# Patient Record
Sex: FEMALE | Race: BLACK OR AFRICAN AMERICAN | NOT HISPANIC OR LATINO | Employment: OTHER | URBAN - METROPOLITAN AREA
[De-identification: names, ages, dates, MRNs, and addresses within clinical notes are randomized per-mention and may not be internally consistent; named-entity substitution may affect disease eponyms.]

---

## 2021-07-20 ENCOUNTER — APPOINTMENT (OUTPATIENT)
Dept: RADIOLOGY | Facility: MEDICAL CENTER | Age: 76
End: 2021-07-20
Attending: EMERGENCY MEDICINE
Payer: MEDICARE

## 2021-07-20 ENCOUNTER — HOSPITAL ENCOUNTER (EMERGENCY)
Facility: MEDICAL CENTER | Age: 76
End: 2021-07-21
Attending: EMERGENCY MEDICINE
Payer: MEDICARE

## 2021-07-20 DIAGNOSIS — R06.02 SHORTNESS OF BREATH: ICD-10-CM

## 2021-07-20 DIAGNOSIS — J98.59 MEDIASTINAL MASS: ICD-10-CM

## 2021-07-20 LAB
ALBUMIN SERPL BCP-MCNC: 4.2 G/DL (ref 3.2–4.9)
ALBUMIN/GLOB SERPL: 1.2 G/DL
ALP SERPL-CCNC: 82 U/L (ref 30–99)
ALT SERPL-CCNC: 14 U/L (ref 2–50)
ANION GAP SERPL CALC-SCNC: 11 MMOL/L (ref 7–16)
AST SERPL-CCNC: 28 U/L (ref 12–45)
BASOPHILS # BLD AUTO: 0.8 % (ref 0–1.8)
BASOPHILS # BLD: 0.04 K/UL (ref 0–0.12)
BILIRUB SERPL-MCNC: 0.3 MG/DL (ref 0.1–1.5)
BUN SERPL-MCNC: 20 MG/DL (ref 8–22)
CALCIUM SERPL-MCNC: 9.9 MG/DL (ref 8.5–10.5)
CHLORIDE SERPL-SCNC: 105 MMOL/L (ref 96–112)
CO2 SERPL-SCNC: 26 MMOL/L (ref 20–33)
CREAT SERPL-MCNC: 1.01 MG/DL (ref 0.5–1.4)
EKG IMPRESSION: NORMAL
EOSINOPHIL # BLD AUTO: 0.18 K/UL (ref 0–0.51)
EOSINOPHIL NFR BLD: 3.4 % (ref 0–6.9)
ERYTHROCYTE [DISTWIDTH] IN BLOOD BY AUTOMATED COUNT: 43.8 FL (ref 35.9–50)
GLOBULIN SER CALC-MCNC: 3.5 G/DL (ref 1.9–3.5)
GLUCOSE SERPL-MCNC: 97 MG/DL (ref 65–99)
HCT VFR BLD AUTO: 45.5 % (ref 37–47)
HGB BLD-MCNC: 14.8 G/DL (ref 12–16)
IMM GRANULOCYTES # BLD AUTO: 0.01 K/UL (ref 0–0.11)
IMM GRANULOCYTES NFR BLD AUTO: 0.2 % (ref 0–0.9)
LYMPHOCYTES # BLD AUTO: 1.82 K/UL (ref 1–4.8)
LYMPHOCYTES NFR BLD: 34.1 % (ref 22–41)
MCH RBC QN AUTO: 29.5 PG (ref 27–33)
MCHC RBC AUTO-ENTMCNC: 32.5 G/DL (ref 33.6–35)
MCV RBC AUTO: 90.6 FL (ref 81.4–97.8)
MONOCYTES # BLD AUTO: 0.59 K/UL (ref 0–0.85)
MONOCYTES NFR BLD AUTO: 11.1 % (ref 0–13.4)
NEUTROPHILS # BLD AUTO: 2.69 K/UL (ref 2–7.15)
NEUTROPHILS NFR BLD: 50.4 % (ref 44–72)
NRBC # BLD AUTO: 0 K/UL
NRBC BLD-RTO: 0 /100 WBC
NT-PROBNP SERPL IA-MCNC: 61 PG/ML (ref 0–125)
PLATELET # BLD AUTO: 242 K/UL (ref 164–446)
PMV BLD AUTO: 9.8 FL (ref 9–12.9)
POTASSIUM SERPL-SCNC: 4.1 MMOL/L (ref 3.6–5.5)
PROT SERPL-MCNC: 7.7 G/DL (ref 6–8.2)
RBC # BLD AUTO: 5.02 M/UL (ref 4.2–5.4)
SODIUM SERPL-SCNC: 142 MMOL/L (ref 135–145)
TROPONIN T SERPL-MCNC: 16 NG/L (ref 6–19)
TROPONIN T SERPL-MCNC: 9 NG/L (ref 6–19)
WBC # BLD AUTO: 5.3 K/UL (ref 4.8–10.8)

## 2021-07-20 PROCEDURE — 700117 HCHG RX CONTRAST REV CODE 255: Performed by: EMERGENCY MEDICINE

## 2021-07-20 PROCEDURE — 80053 COMPREHEN METABOLIC PANEL: CPT

## 2021-07-20 PROCEDURE — 84484 ASSAY OF TROPONIN QUANT: CPT

## 2021-07-20 PROCEDURE — 83880 ASSAY OF NATRIURETIC PEPTIDE: CPT

## 2021-07-20 PROCEDURE — 85025 COMPLETE CBC W/AUTO DIFF WBC: CPT

## 2021-07-20 PROCEDURE — 71275 CT ANGIOGRAPHY CHEST: CPT

## 2021-07-20 PROCEDURE — 99284 EMERGENCY DEPT VISIT MOD MDM: CPT

## 2021-07-20 PROCEDURE — 93005 ELECTROCARDIOGRAM TRACING: CPT | Performed by: EMERGENCY MEDICINE

## 2021-07-20 PROCEDURE — 36415 COLL VENOUS BLD VENIPUNCTURE: CPT

## 2021-07-20 PROCEDURE — 71045 X-RAY EXAM CHEST 1 VIEW: CPT

## 2021-07-20 PROCEDURE — 93005 ELECTROCARDIOGRAM TRACING: CPT

## 2021-07-20 RX ORDER — LISINOPRIL 10 MG/1
10 TABLET ORAL DAILY
COMMUNITY

## 2021-07-20 RX ORDER — ALLOPURINOL 100 MG/1
100 TABLET ORAL DAILY
COMMUNITY

## 2021-07-20 RX ADMIN — IOHEXOL 55 ML: 350 INJECTION, SOLUTION INTRAVENOUS at 23:23

## 2021-07-21 VITALS
SYSTOLIC BLOOD PRESSURE: 153 MMHG | TEMPERATURE: 97.4 F | OXYGEN SATURATION: 92 % | DIASTOLIC BLOOD PRESSURE: 70 MMHG | HEART RATE: 72 BPM | WEIGHT: 189 LBS | HEIGHT: 62 IN | RESPIRATION RATE: 25 BRPM | BODY MASS INDEX: 34.78 KG/M2

## 2021-07-21 NOTE — DISCHARGE INSTRUCTIONS
Please follow-up with your primary care physician, as well as the pulmonology clinic listed above.  Call at the opening of business hours today, let them know that there was an abnormal mass seen on your chest CT in the emergency department today that requires close follow-up.  Please have them compare your CT scan today, with your prior CT scan performed in [place].  They should be able to see you within 1 week.  Return to the emergency department for recheck, or call for assistance, if you are not able to schedule a follow-up appointment within the next 5 to 7 days.  Additionally, please return if you develop any new or worsening symptoms, this includes chest pain, shortness of breath, worsening lightheadedness, or any further concerns.

## 2021-07-21 NOTE — NON-PROVIDER
Medical Student Note- DO NOT USE FOR PAT      CHIEF COMPLAINT  Chief Complaint   Patient presents with   • Shortness of Breath     worse with exertion   • Lightheadedness     dizziness with exertion       HPI  Rosa Phillip is a 76 y.o. female who presents for 6 day history of exertional shortness of breath and lightheadedness. Patient has a history of HTN and gout with previous unprovoked PE in 2011, not currently on anticoagulation. Patient additionally reports multiple presyncopal episodes with exertion associated with palpitations. Denies falls. Patient denies chest pain, orthopnea, fevers, hematemesis, and lower extremity swelling. No recent travel, surgeries or sick contacts. Patient states current SOB is similar to previous presentation of PE. She reports her baseline is being able to walk over 1 mile without difficulty. Currently reporting SOB walking up 1 flight of stairs or with minimal exertion. Decreased when sitting down. No history of hypercoagulable disorders or cancer.     REVIEW OF SYSTEMS  Negative for fever, cough, chest pain, abdominal pain, nausea, vomiting, diarrhea, focal weakness, peripheral swelling, back pain. All other systems are negative.     PAST MEDICAL HISTORY  HTN on Lisinopril; Reports taking as prescribed.  Gout on Allopurinol; Reports taking as prescribed.    FAMILY HISTORY  Patient denies family history of hypercoagulable disorders.    SOCIAL HISTORY  Social History     Tobacco Use   • Smoking status: Never Smoker   • Smokeless tobacco: Denies   Substance Use Topics   • Alcohol use: Denies   • Drug use: Denies       SURGICAL HISTORY  Denies recent surgeries    CURRENT MEDICATIONS  I personally reviewed the medication list in the charting documentation.     ALLERGIES  Allergies   Allergen Reactions   • Ceclor [Cefaclor]    • Cipro [Ciprofloxacin Hcl]        MEDICAL RECORD  I have reviewed patient's medical record and pertinent results are listed above.      PHYSICAL  "EXAM  VITAL SIGNS: /78   Pulse (!) 102   Temp 36.3 °C (97.4 °F) (Temporal)   Resp 18   Ht 1.575 m (5' 2\")   Wt 85.7 kg (189 lb)   SpO2 95%   BMI 34.57 kg/m²    Constitutional: Well appearing patient in no acute distress.  Awake and alert, not toxic nor ill in appearance.  HENT: Normocephalic, no obvious evidence of acute trauma.  Neck: Comfortable movement without any obvious restriction in the range of motion.  Cardiovascular: Tachycardia. Normal rhythm. No m/r/g   Thorax & Lungs: Lungs clear to ascultation bilaterally. Nonlabored respirations.  No chest wall tenderness.  No wheezing, rhonchi or rales.   Abdomen: No distention. No tenderness to palpation.  No masses appreciated.  Extremities/Musculoskeletal: No sign of acute trauma. No asymmetric calf tenderness or edema.   Neurologic: Alert & oriented. No focal deficits observed.   Psychiatric: Normal affect appropriate for the clinical situation.    LABS/EKGs  Recent Labs     07/20/21  1836   WBC 5.3   RBC 5.02   HEMOGLOBIN 14.8   HEMATOCRIT 45.5   MCV 90.6   MCH 29.5   RDW 43.8   PLATELETCT 242   MPV 9.8   NEUTSPOLYS 50.40   LYMPHOCYTES 34.10   MONOCYTES 11.10   EOSINOPHILS 3.40   BASOPHILS 0.80       EKG- Sinus tachycardia. No prior EKG to compare to. No ST segment or T wave abnormalities. No S1Q3T3 abnormality visualized.    RADIOLOGY  CT-CTA CHEST PULMONARY ARTERY W/ RECONS   Final Result         1.  No pulmonary embolus appreciated.   2.  Soft tissue densities in the anterior mediastinum, concerning for adenopathy versus anterior mediastinal mass. Additional workup and evaluation for mediastinal masses recommended as clinically appropriate.   3.  Atherosclerosis.   4.  Pulmonary nodule, see nodule follow-up recommendations below.      Fleischner Society pulmonary nodule recommendations:      Low Risk: No routine follow-up      High Risk: Optional CT at 12 months      Comments: Nodules less than 6 mm do not require routine follow-up, but certain " patients at high risk with suspicious nodule morphology, upper lobe location, or both may warrant 12-month follow-up.      Low Risk - Minimal or absent history of smoking and of other known risk factors.      High Risk - History of smoking or of other known risk factors.      Note: These recommendations do not apply to lung cancer screening, patients with immunosuppression, or patients with known primary cancer.      Fleischner Society 2017 Guidelines for Management of Incidentally Detected Pulmonary Nodules in Adults      DX-CHEST-PORTABLE (1 VIEW)   Final Result      No acute cardiopulmonary findings            COURSE & MEDICAL DECISION MAKING  I have reviewed any medical record information, laboratory studies and radiographic results as noted above.  Differential diagnoses includes: PE, MI, CHF, paroxysmal arrhythmia, orthostasis, Asthma    Encounter Summary: This is a pleasant 76 y.o. female who was evaluated in the emergency department today for SOB and lightheadedness with multiple presyncopal episodes. Patient has a history of unprovoked PE in 2011 not currently on anticoagulation. On exam, patient is hypertensive and tachycardic with clear lungs to ascultation bilaterally. Given patient's history of unprovoked PE not on anticoagulation and tachycardia CTA ordered for evaluation of PE. CTA negative for PE.      DISPOSITION: ***      FINAL IMPRESSION         Electronically signed by: Davida Galindo, Student, 7/20/2021 9:37 PM    ]

## 2021-07-21 NOTE — ED PROVIDER NOTES
"ED Provider Note    Chief Complaint:   Shortness of breath, lightheadedness    HPI:  Rosa Phillip is a very pleasant 76-year-old woman who presents to the emergency department today for evaluation of shortness of breath and lightheadedness.  She reports her symptoms began about 6 days ago, and has been persistent since that time.  She reports shortness of breath that is worsened by exertion, though not associated with chest pain.  She reports no associated lower extremity edema, no recent fevers, and no nausea or vomiting.  She does report symptoms of lightheadedness which seem to improve with rest or lying flat.  She does have a remote history of pulmonary embolism, this was not determined to be provoked, she was on anticoagulation for 2 years then taken off of that medication.  She is currently not on any anticoagulation or antiplatelet agents.  She has no associated headaches, no abdominal pain.  She is unable to identify any reliably alleviating factors.    Review of Systems:  See HPI for pertinent positives and negatives. All other systems negative.    Past Medical History:       Social History:  Social History     Tobacco Use   • Smoking status: Never Smoker   • Smokeless tobacco: Never Used   Substance and Sexual Activity   • Alcohol use: Not Currently   • Drug use: Not Currently   • Sexual activity: Not on file       Surgical History:  patient denies any surgical history    Current Medications:  Home Medications     Reviewed by Juliann Shah R.N. (Registered Nurse) on 07/20/21 at 1720  Med List Status: Not Addressed   Medication Last Dose Status   allopurinol (ZYLOPRIM) 100 MG Tab  Active   lisinopril (PRINIVIL) 10 MG Tab  Active                Allergies:  Allergies   Allergen Reactions   • Ceclor [Cefaclor]    • Cipro [Ciprofloxacin Hcl]        Physical Exam:  Vital Signs: /67   Pulse 74   Temp 36.3 °C (97.4 °F) (Temporal)   Resp (!) 21   Ht 1.575 m (5' 2\")   Wt 85.7 kg (189 lb)   " SpO2 95%   BMI 34.57 kg/m²   Constitutional: Alert, no acute distress  HENT: Normocephalic, mask in place  Eyes: Pupils equal and reactive, normal conjunctiva  Neck: Supple, normal range of motion, no stridor  Cardiovascular: Extremities are warm and well perfused, no murmur appreciated, normal cardiac auscultation  Pulmonary: No respiratory distress, normal work of breathing, no accessory muscule usage, breath sounds clear and equal bilaterally, no wheezing, no coarse breath sounds  Abdomen: Soft, non-distended, non-tender to palpation, no peritoneal signs  Skin: Warm, dry, no rashes or lesions  Musculoskeletal: Normal range of motion in all extremities, no swelling or deformity noted, no peripheral edema  Neurologic: Alert, oriented, normal speech, normal motor function  Psychiatric: Normal and appropriate mood and affect    Medical records reviewed for continuity of care.  No prior medical records available for review.    EKG: Rate 104, sinus tachycardia, no ST elevation, no pathologic T wave inversions, no S1Q3T3 morphology    Labs:  Labs Reviewed   CBC WITH DIFFERENTIAL - Abnormal; Notable for the following components:       Result Value    MCHC 32.5 (*)     All other components within normal limits   ESTIMATED GFR - Abnormal; Notable for the following components:    GFR If Non  53 (*)     All other components within normal limits   COMP METABOLIC PANEL   TROPONIN   TROPONIN   PROBRAIN NATRIURETIC PEPTIDE, NT       Radiology:  CT-CTA CHEST PULMONARY ARTERY W/ RECONS   Final Result         1.  No pulmonary embolus appreciated.   2.  Soft tissue densities in the anterior mediastinum, concerning for adenopathy versus anterior mediastinal mass. Additional workup and evaluation for mediastinal masses recommended as clinically appropriate.   3.  Atherosclerosis.   4.  Pulmonary nodule, see nodule follow-up recommendations below.      Fleischner Society pulmonary nodule recommendations:      Low Risk:  No routine follow-up      High Risk: Optional CT at 12 months      Comments: Nodules less than 6 mm do not require routine follow-up, but certain patients at high risk with suspicious nodule morphology, upper lobe location, or both may warrant 12-month follow-up.      Low Risk - Minimal or absent history of smoking and of other known risk factors.      High Risk - History of smoking or of other known risk factors.      Note: These recommendations do not apply to lung cancer screening, patients with immunosuppression, or patients with known primary cancer.      Fleischner Society 2017 Guidelines for Management of Incidentally Detected Pulmonary Nodules in Adults      DX-CHEST-PORTABLE (1 VIEW)   Final Result      No acute cardiopulmonary findings           ED Medications Administered:  Medications   iohexol (OMNIPAQUE) 350 mg/mL (55 mL Intravenous Given 7/20/21 5788)       Differential diagnosis:  Pulmonary embolism, pleural effusion, pulmonary edema, aortic injury, symptomatic anemia, electrolyte abnormality, other metabolic derangement    MDM:  Ms. Phillip presents to the emergency department today for evaluation of persistent shortness of breath over the past 6 days.  She is concerned because she states her symptoms feel similar to when she was diagnosed with a pulmonary embolism several years ago.  She is not currently anticoagulated.  She is uncertain if hypercoagulability work-up was done at that time.  Prior medical records from that diagnosis are not available for review.  On arrival to the emergency department she is not hypotensive, though she is mildly tachycardic.  EKG shows sinus tachycardia without acute ischemic changes.  She had no associated chest pain, this is less concerning for acute coronary syndrome.    On laboratory evaluation high-sensitivity troponin, as well as 2-hour high-sensitivity troponin are both negative with no increase.  proBNP is within normal limits, this is less concerning for  fluid overload or new onset heart failure.  CMP is entirely within normal limits, she has no electrolyte abnormalities.  She is a normal white blood count, she is afebrile, with regard to her vital signs and white blood count, she has no evidence of infectious etiology.  She reports no history of fevers or symptoms of infection.  She has no upper respiratory symptoms.  She is vaccinated for COVID-19.    Due to history of pulmonary embolism, symptoms of shortness of breath and lightheadedness, as well as sinus tachycardia on arrival to the emergency department, I did obtain a CTA of the chest as she is high risk for recurrent pulmonary embolism.  Unable to rule out PE using PERC or Wells scoring.  No pulmonary embolism appreciated.  Soft tissue densities noted in the anterior mediastinum concerning for adenopathy versus anterior mediastinal mass.  Pulmonary nodules noted as well.    At this time, do not think requires any further emergent diagnostics nor treatment.  I discussed the mediastinal mass, she states that that had previously been evaluated in [place] by a pulmonologist, and was being followed.  I did refer her to [facility] to continue surveillance, and for comparison with prior scans.  She is referred to primary care for further evaluation, and consideration for biopsy, or other testing as appropriate. Return precautions were discussed with the patient, and provided in written form with the patient's discharge instructions.     Personal protective equipment including N95 surgical respirator, goggles, and gloves were used during this encounter.       Disposition:  Discharge home in stable condition    Final Impression:  1. Shortness of breath    2. Mediastinal mass        Electronically signed by: Mervat Castro MD, 7/21/2021 12:38 AM

## 2021-07-21 NOTE — ED TRIAGE NOTES
Chief Complaint   Patient presents with   • Shortness of Breath     worse with exertion   • Lightheadedness     dizziness with exertion     Pt ambulatory to triage for above complaint. Pt reports symptoms have been going on since last Thursday. Pt reports last time she had symptoms like this she had blood clots in her lungs. Pt denies chest pain.   EKG completed.

## 2021-10-12 ENCOUNTER — HOSPITAL ENCOUNTER (EMERGENCY)
Facility: MEDICAL CENTER | Age: 76
End: 2021-10-13
Attending: EMERGENCY MEDICINE
Payer: MEDICARE

## 2021-10-12 DIAGNOSIS — I10 HYPERTENSION, UNSPECIFIED TYPE: ICD-10-CM

## 2021-10-12 DIAGNOSIS — M54.50 LUMBAR BACK PAIN: ICD-10-CM

## 2021-10-12 PROCEDURE — 99284 EMERGENCY DEPT VISIT MOD MDM: CPT

## 2021-10-12 PROCEDURE — 93005 ELECTROCARDIOGRAM TRACING: CPT | Performed by: EMERGENCY MEDICINE

## 2021-10-12 RX ORDER — MORPHINE SULFATE 4 MG/ML
4 INJECTION, SOLUTION INTRAMUSCULAR; INTRAVENOUS ONCE
Status: DISCONTINUED | OUTPATIENT
Start: 2021-10-13 | End: 2021-10-13 | Stop reason: HOSPADM

## 2021-10-12 ASSESSMENT — FIBROSIS 4 INDEX: FIB4 SCORE: 2.41

## 2021-10-13 ENCOUNTER — HOSPITAL ENCOUNTER (OUTPATIENT)
Dept: RADIOLOGY | Facility: MEDICAL CENTER | Age: 76
End: 2021-10-13
Attending: EMERGENCY MEDICINE
Payer: MEDICARE

## 2021-10-13 VITALS
DIASTOLIC BLOOD PRESSURE: 79 MMHG | SYSTOLIC BLOOD PRESSURE: 173 MMHG | RESPIRATION RATE: 16 BRPM | TEMPERATURE: 98 F | BODY MASS INDEX: 32.9 KG/M2 | HEIGHT: 62 IN | WEIGHT: 178.79 LBS | OXYGEN SATURATION: 98 % | HEART RATE: 97 BPM

## 2021-10-13 LAB
ALBUMIN SERPL BCP-MCNC: 4.2 G/DL (ref 3.2–4.9)
ALBUMIN/GLOB SERPL: 1.2 G/DL
ALP SERPL-CCNC: 91 U/L (ref 30–99)
ALT SERPL-CCNC: 18 U/L (ref 2–50)
ANION GAP SERPL CALC-SCNC: 14 MMOL/L (ref 7–16)
AST SERPL-CCNC: 24 U/L (ref 12–45)
BASOPHILS # BLD AUTO: 0.5 % (ref 0–1.8)
BASOPHILS # BLD: 0.03 K/UL (ref 0–0.12)
BILIRUB SERPL-MCNC: 0.2 MG/DL (ref 0.1–1.5)
BUN SERPL-MCNC: 16 MG/DL (ref 8–22)
CALCIUM SERPL-MCNC: 9.9 MG/DL (ref 8.5–10.5)
CHLORIDE SERPL-SCNC: 103 MMOL/L (ref 96–112)
CO2 SERPL-SCNC: 24 MMOL/L (ref 20–33)
CREAT SERPL-MCNC: 0.83 MG/DL (ref 0.5–1.4)
EKG IMPRESSION: NORMAL
EOSINOPHIL # BLD AUTO: 0.19 K/UL (ref 0–0.51)
EOSINOPHIL NFR BLD: 2.9 % (ref 0–6.9)
ERYTHROCYTE [DISTWIDTH] IN BLOOD BY AUTOMATED COUNT: 42.5 FL (ref 35.9–50)
GLOBULIN SER CALC-MCNC: 3.6 G/DL (ref 1.9–3.5)
GLUCOSE SERPL-MCNC: 103 MG/DL (ref 65–99)
HCT VFR BLD AUTO: 41.2 % (ref 37–47)
HGB BLD-MCNC: 13.5 G/DL (ref 12–16)
IMM GRANULOCYTES # BLD AUTO: 0.02 K/UL (ref 0–0.11)
IMM GRANULOCYTES NFR BLD AUTO: 0.3 % (ref 0–0.9)
LYMPHOCYTES # BLD AUTO: 2.08 K/UL (ref 1–4.8)
LYMPHOCYTES NFR BLD: 32.2 % (ref 22–41)
MCH RBC QN AUTO: 28.7 PG (ref 27–33)
MCHC RBC AUTO-ENTMCNC: 32.8 G/DL (ref 33.6–35)
MCV RBC AUTO: 87.7 FL (ref 81.4–97.8)
MONOCYTES # BLD AUTO: 0.77 K/UL (ref 0–0.85)
MONOCYTES NFR BLD AUTO: 11.9 % (ref 0–13.4)
NEUTROPHILS # BLD AUTO: 3.37 K/UL (ref 2–7.15)
NEUTROPHILS NFR BLD: 52.2 % (ref 44–72)
NRBC # BLD AUTO: 0 K/UL
NRBC BLD-RTO: 0 /100 WBC
PLATELET # BLD AUTO: 259 K/UL (ref 164–446)
PMV BLD AUTO: 9.8 FL (ref 9–12.9)
POTASSIUM SERPL-SCNC: 3.8 MMOL/L (ref 3.6–5.5)
PROT SERPL-MCNC: 7.8 G/DL (ref 6–8.2)
RBC # BLD AUTO: 4.7 M/UL (ref 4.2–5.4)
SODIUM SERPL-SCNC: 141 MMOL/L (ref 135–145)
TROPONIN T SERPL-MCNC: 12 NG/L (ref 6–19)
WBC # BLD AUTO: 6.5 K/UL (ref 4.8–10.8)

## 2021-10-13 PROCEDURE — 700117 HCHG RX CONTRAST REV CODE 255: Performed by: EMERGENCY MEDICINE

## 2021-10-13 PROCEDURE — 71275 CT ANGIOGRAPHY CHEST: CPT | Mod: ME

## 2021-10-13 PROCEDURE — 96374 THER/PROPH/DIAG INJ IV PUSH: CPT | Mod: XU

## 2021-10-13 PROCEDURE — A9270 NON-COVERED ITEM OR SERVICE: HCPCS | Performed by: EMERGENCY MEDICINE

## 2021-10-13 PROCEDURE — 85025 COMPLETE CBC W/AUTO DIFF WBC: CPT

## 2021-10-13 PROCEDURE — 700111 HCHG RX REV CODE 636 W/ 250 OVERRIDE (IP): Performed by: EMERGENCY MEDICINE

## 2021-10-13 PROCEDURE — 80053 COMPREHEN METABOLIC PANEL: CPT

## 2021-10-13 PROCEDURE — 84484 ASSAY OF TROPONIN QUANT: CPT

## 2021-10-13 PROCEDURE — 700102 HCHG RX REV CODE 250 W/ 637 OVERRIDE(OP): Performed by: EMERGENCY MEDICINE

## 2021-10-13 RX ORDER — CYCLOBENZAPRINE HCL 5 MG
5-10 TABLET ORAL 3 TIMES DAILY PRN
Qty: 20 TABLET | Refills: 0 | Status: SHIPPED | OUTPATIENT
Start: 2021-10-13

## 2021-10-13 RX ORDER — HYDRALAZINE HYDROCHLORIDE 20 MG/ML
10 INJECTION INTRAMUSCULAR; INTRAVENOUS ONCE
Status: COMPLETED | OUTPATIENT
Start: 2021-10-13 | End: 2021-10-13

## 2021-10-13 RX ORDER — IBUPROFEN 600 MG/1
600 TABLET ORAL EVERY 6 HOURS PRN
Qty: 30 TABLET | Refills: 0 | Status: SHIPPED | OUTPATIENT
Start: 2021-10-13

## 2021-10-13 RX ORDER — IBUPROFEN 600 MG/1
600 TABLET ORAL ONCE
Status: COMPLETED | OUTPATIENT
Start: 2021-10-13 | End: 2021-10-13

## 2021-10-13 RX ORDER — KETOROLAC TROMETHAMINE 30 MG/ML
15 INJECTION, SOLUTION INTRAMUSCULAR; INTRAVENOUS ONCE
Status: DISCONTINUED | OUTPATIENT
Start: 2021-10-13 | End: 2021-10-13

## 2021-10-13 RX ORDER — LORAZEPAM 2 MG/ML
2 INJECTION INTRAMUSCULAR ONCE
Status: DISCONTINUED | OUTPATIENT
Start: 2021-10-13 | End: 2021-10-13

## 2021-10-13 RX ADMIN — IOHEXOL 70 ML: 350 INJECTION, SOLUTION INTRAVENOUS at 01:26

## 2021-10-13 RX ADMIN — HYDRALAZINE HYDROCHLORIDE 10 MG: 20 INJECTION INTRAMUSCULAR; INTRAVENOUS at 03:00

## 2021-10-13 RX ADMIN — IBUPROFEN 600 MG: 600 TABLET ORAL at 02:59

## 2021-10-13 NOTE — DISCHARGE INSTRUCTIONS
Ice to your back 20 minutes at a time 3-4 times a day, you can alternate with heat. Take your blood pressure daily. Return if you have chest pain, shortness of breath, severe headache, your blood pressure greater that 200 in the top number.

## 2021-10-13 NOTE — ED TRIAGE NOTES
"Chief Complaint   Patient presents with   • Hypertension       Patient ambulatory to triage with above complaint. States that he blood pressure was elevated yesterday. Today she started to have lower back pain 7/10 pain at 2200 tonight.   Bilateral BP taken.   Charge update on patient.     Blood Pressure : 153/87, Pulse: 93, Respiration: 18, Temperature: 36.8 °C (98.2 °F), Height: 157.5 cm (5' 2\"), Weight: 81.1 kg (178 lb 12.7 oz), Pulse Oximetry: 93 %, O2 (LPM): 0    "

## 2021-10-13 NOTE — ED PROVIDER NOTES
"ED Provider Note    Scribed for Phillip Caldera M.D. by Ce Hoskins. 10/12/2021, 11:47 PM.    Primary care provider: Mohsen Tamasaby, M.D.  Means of arrival: Walk In  History obtained from: Patient  History limited by: None    CHIEF COMPLAINT  Chief Complaint   Patient presents with   • Hypertension       HPI  Rosa Phillip is a 76 y.o. female who presents to the Emergency Department for hypertension onset 1 night ago. The patient states 1 night ago her blood pressure was elevated with systolic pressures in the 200's. She reports when she woke up this morning she developed lower back pain that she rates as a 9/10 in severity. The patient denies her pain radiating elsewhere in her body. She says she believes her \"intense pain\" may be the cause of her high blood pressure, and notes her episodes occur whenever she tries to pick something up. The patient adds she will often lay in bed and experience bouts of severe pain. After her symptoms failed to resolve she was prompted to come into the [facility] ED tonight for further evaluation. No alleviating factors were noted, and pain is exacerbating by bending down. Patient has associated lower back pain, but denies fever, chills, nausea, vomiting, chest pain, leg swelling, leg pain, or shortness of breath. She is allergic to Cipro and takes Lisinopril. Patient denies any history of aortic dissection or aortic aneurysm.      REVIEW OF SYSTEMS  Pertinent positives include hypertension and lower back pain. Pertinent negatives include fever, chills, nausea, vomiting, chest pain, leg swelling, leg pain, or shortness of breath. All other systems negative.    PAST MEDICAL HISTORY  None noted     SURGICAL HISTORY  patient denies any surgical history    SOCIAL HISTORY  Social History     Tobacco Use   • Smoking status: Never Smoker   • Smokeless tobacco: Never Used   Substance Use Topics   • Alcohol use: Not Currently   • Drug use: Not Currently      Social History " "    Substance and Sexual Activity   Drug Use Not Currently       FAMILY HISTORY  No family history noted.     CURRENT MEDICATIONS  Home Medications       Reviewed by Crisatl Lowry R.N. (Registered Nurse) on 10/12/21 at 2322  Med List Status: Complete     Medication Last Dose Status   allopurinol (ZYLOPRIM) 100 MG Tab 10/12/2021 Active   lisinopril (PRINIVIL) 10 MG Tab 10/12/2021 Active                    ALLERGIES  Allergies   Allergen Reactions   • Ceclor [Cefaclor]    • Cipro [Ciprofloxacin Hcl]        PHYSICAL EXAM  VITAL SIGNS: /87   Pulse 93   Temp 36.8 °C (98.2 °F) (Temporal)   Resp 18   Ht 1.575 m (5' 2\")   Wt 81.1 kg (178 lb 12.7 oz)   SpO2 93%   BMI 32.70 kg/m²     Constitutional: Well developed, Well nourished, Mild distress.   HENT: Normocephalic, Atraumatic, mask in place.  Eyes: Conjunctiva normal, No discharge.   Neck: Supple, No stridor, no tenderness   Cardiovascular: Normal heart rate, Normal rhythm, No murmurs, equal pulses.   Pulmonary: Normal breath sounds, No respiratory distress, No wheezing, No rales, No rhonchi.  Chest: No chest wall tenderness or deformity.   Abdomen:Soft, No tenderness, No masses, no rebound, no guarding.   Back: No CVA tenderness.   Musculoskeletal: No vertebral point tenderness, No muscle spasm in back, Pain not reproducible without palpation, No major deformities noted, No tenderness.   Skin: Warm, Dry, No erythema, No rash.   Neurologic: Negative straight leg raise at 90 °, 5/5 strength in the upper and lower extremities, Alert & oriented x 3, Normal motor function,  No focal deficits noted.   Psychiatric: Affect normal, Judgment normal, Mood normal.     LABS  Results for orders placed or performed during the hospital encounter of 10/12/21   CBC w/ Differential   Result Value Ref Range    WBC 6.5 4.8 - 10.8 K/uL    RBC 4.70 4.20 - 5.40 M/uL    Hemoglobin 13.5 12.0 - 16.0 g/dL    Hematocrit 41.2 37.0 - 47.0 %    MCV 87.7 81.4 - 97.8 fL    MCH 28.7 27.0 - " 33.0 pg    MCHC 32.8 (L) 33.6 - 35.0 g/dL    RDW 42.5 35.9 - 50.0 fL    Platelet Count 259 164 - 446 K/uL    MPV 9.8 9.0 - 12.9 fL    Neutrophils-Polys 52.20 44.00 - 72.00 %    Lymphocytes 32.20 22.00 - 41.00 %    Monocytes 11.90 0.00 - 13.40 %    Eosinophils 2.90 0.00 - 6.90 %    Basophils 0.50 0.00 - 1.80 %    Immature Granulocytes 0.30 0.00 - 0.90 %    Nucleated RBC 0.00 /100 WBC    Neutrophils (Absolute) 3.37 2.00 - 7.15 K/uL    Lymphs (Absolute) 2.08 1.00 - 4.80 K/uL    Monos (Absolute) 0.77 0.00 - 0.85 K/uL    Eos (Absolute) 0.19 0.00 - 0.51 K/uL    Baso (Absolute) 0.03 0.00 - 0.12 K/uL    Immature Granulocytes (abs) 0.02 0.00 - 0.11 K/uL    NRBC (Absolute) 0.00 K/uL   Complete Metabolic Panel (CMP)   Result Value Ref Range    Sodium 141 135 - 145 mmol/L    Potassium 3.8 3.6 - 5.5 mmol/L    Chloride 103 96 - 112 mmol/L    Co2 24 20 - 33 mmol/L    Anion Gap 14.0 7.0 - 16.0    Glucose 103 (H) 65 - 99 mg/dL    Bun 16 8 - 22 mg/dL    Creatinine 0.83 0.50 - 1.40 mg/dL    Calcium 9.9 8.5 - 10.5 mg/dL    AST(SGOT) 24 12 - 45 U/L    ALT(SGPT) 18 2 - 50 U/L    Alkaline Phosphatase 91 30 - 99 U/L    Total Bilirubin 0.2 0.1 - 1.5 mg/dL    Albumin 4.2 3.2 - 4.9 g/dL    Total Protein 7.8 6.0 - 8.2 g/dL    Globulin 3.6 (H) 1.9 - 3.5 g/dL    A-G Ratio 1.2 g/dL   Troponin STAT   Result Value Ref Range    Troponin T 12 6 - 19 ng/L   ESTIMATED GFR   Result Value Ref Range    GFR If African American >60 >60 mL/min/1.73 m 2    GFR If Non African American >60 >60 mL/min/1.73 m 2   EKG   Result Value Ref Range    Report       [facility] Emergency Dept.    Test Date:  2021-10-12  Pt Name:    GRAHAM FREEDMAN              Department: ER  MRN:        [id number]                      Room:  Gender:     Female                       Technician: [id number]  :        [id number]                   Requested By:ER TRIAGE PROTOCOL  Order #:    [id number]                    Reading MD:    Measurements  Intervals                                 Axis  Rate:       76                           P:          63  OH:         164                          QRS:        31  QRSD:       80                           T:          71  QT:         392  QTc:        441    Interpretive Statements  SINUS RHYTHM  Compared to ECG 07/20/2021 17:19:28  Sinus tachycardia no longer present         All labs reviewed by me.    EKG  12 Lead EKG interpreted by me as noted above.     RADIOLOGY  CT-CTA COMPLETE THORACOABDOMINAL AORTA   Final Result      1.  No aortic aneurysm or dissection identified.      2.  Mild calcific atherosclerosis.      3.  Mediastinal adenopathy again identified.      4.  No change in 4 mm right middle lobe pulmonary nodule.      5.  No change in significant emphysema.                          The radiologist's interpretation of all radiological studies have been reviewed by me.    COURSE & MEDICAL DECISION MAKING  Pertinent Labs & Imaging studies reviewed. (See chart for details)    11:47 PM - Patient seen and examined at bedside. Discussed plan of care with patient. I informed them that labs and imaging will be ordered to evaluate symptoms. The patient is understanding and agreeable with plan of care. Patient will be treated with 4 mg Morphine. Ordered CT-CTA Complete Thoracoabdominal Aorta, CBC w/ Differential, CMP, and Troponin STAT to evaluate her symptoms. The differential diagnoses include but are not limited to: Aortic Dissection, Hypertensive Urgency, Musculoskeletal Back Pain     1 AM patient was reevaluated at bedside. Discussed lab  and radiology  results with the patient and informed them that labs and imaging look okay.  Her blood pressure did start coming down nicely on its own without any intervention.  Discussed with her that I thought her back pain was likely musculoskeletal in nature.  Would prescribe her some ibuprofen and Tylenol.          Medical Decision Making: Patient presents with severe high blood pressure and pain radiating  to her back and flank I was concerned about possible aortic dissection given how elevated her blood pressure was and how bad she reported her pain was.  Patient underwent CT which is negative for intercranial hemorrhage.  Her blood pressure did come down without much interventions I therefore think she can be discharged home.  She is not show any signs of congestive heart failure from this.     The patient will return for new or worsening symptoms and is stable at the time of discharge.    The patient is referred to a primary physician for blood pressure management, diabetic screening, and for all other preventative health concerns.    DISPOSITION:  Patient will be discharged home in stable condition.    FOLLOW UP:  Mohsen Tamasaby, M.D.  [address] [address]  [phone]    Schedule an appointment as soon as possible for a visit in 3 days        OUTPATIENT MEDICATIONS:  New Prescriptions    CYCLOBENZAPRINE (FLEXERIL) 5 MG TABLET    Take 1-2 Tablets by mouth 3 times a day as needed.    IBUPROFEN (MOTRIN) 600 MG TAB    Take 1 Tablet by mouth every 6 hours as needed.        FINAL IMPRESSION  1. Hypertension, unspecified type    2. Lumbar back pain          Ce MASON (Radha), am scribing for, and in the presence of, Phillip Caldera M.D.    Electronically signed by: Ce Hoskins (Radha), 10/12/2021    Phillip MASON M.D. personally performed the services described in this documentation, as scribed by Ce Hoskins in my presence, and it is both accurate and complete. C    The note accurately reflects work and decisions made by me.  Phillip Caldera M.D.  10/13/2021  2:34 AM